# Patient Record
Sex: MALE | Race: WHITE | NOT HISPANIC OR LATINO | Employment: OTHER | URBAN - METROPOLITAN AREA
[De-identification: names, ages, dates, MRNs, and addresses within clinical notes are randomized per-mention and may not be internally consistent; named-entity substitution may affect disease eponyms.]

---

## 2022-10-19 ENCOUNTER — ANCILLARY PROCEDURE (OUTPATIENT)
Dept: GENERAL RADIOLOGY | Facility: CLINIC | Age: 42
End: 2022-10-19
Attending: FAMILY MEDICINE
Payer: COMMERCIAL

## 2022-10-19 ENCOUNTER — OFFICE VISIT (OUTPATIENT)
Dept: ORTHOPEDICS | Facility: CLINIC | Age: 42
End: 2022-10-19
Payer: COMMERCIAL

## 2022-10-19 ENCOUNTER — PRE VISIT (OUTPATIENT)
Dept: ORTHOPEDICS | Facility: CLINIC | Age: 42
End: 2022-10-19

## 2022-10-19 DIAGNOSIS — S86.012A ACHILLES TENDON RUPTURE, LEFT, INITIAL ENCOUNTER: Primary | ICD-10-CM

## 2022-10-19 DIAGNOSIS — M25.572 LEFT ANKLE PAIN: ICD-10-CM

## 2022-10-19 PROCEDURE — 99204 OFFICE O/P NEW MOD 45 MIN: CPT | Performed by: FAMILY MEDICINE

## 2022-10-19 PROCEDURE — 73610 X-RAY EXAM OF ANKLE: CPT | Mod: LT | Performed by: STUDENT IN AN ORGANIZED HEALTH CARE EDUCATION/TRAINING PROGRAM

## 2022-10-19 NOTE — TELEPHONE ENCOUNTER
DIAGNOSIS: Left achilles possible rupture DOI: 10/15/22 - Ok'd per Germaine   APPOINTMENT DATE: 10.25.22   NOTES STATUS DETAILS   OFFICE NOTE from referring provider Internal 10.19.22 Dr Sg Calderón, [facility]    LABS     XRAYS (IMAGES & REPORTS) Internal 10.19.22 L ankle

## 2022-10-19 NOTE — LETTER
10/19/2022    RE: Humble Lombardo  [address] [address]     Dear Colleague,    Thank you for referring your patient, Humble Lombardo, to the [facility] [place]. Please see a copy of my visit note below.    Sports Medicine Clinic Visit    PCP: No Ref-Primary, Physician    Humble Lombardo is a 42 year old male who is seen  as self referral presenting with left lower leg pain    Injury: pt notes pushing his boat on a trailer and feeling a pop in his left achilles tendon, 4 days ago    Location of Pain: left achilles  Duration of Pain: 4 day(s)  Rating of Pain: 2/10  Pain is better with: Ice  Pain is worse with: walking  Additional Features: bruising, weakness  Treatment so far consists of: Ice  Prior History of related problems: no    Patient works as a .  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    There were no vitals taken for this visit.      PMH:  Chin laceration 2011  Right distal biceps rupture repair    Active problem list:  There is no problem list on file for this patient.      FH:  No family history on file.    SH:  Social History     Socioeconomic History     Marital status:      Spouse name: Not on file     Number of children: Not on file     Years of education: Not on file     Highest education level: Not on file   Occupational History     Not on file   Tobacco Use     Smoking status: Not on file     Smokeless tobacco: Not on file   Substance and Sexual Activity     Alcohol use: Not on file     Drug use: Not on file     Sexual activity: Not on file   Other Topics Concern     Not on file   Social History Narrative     Not on file     Social Determinants of Health     Financial Resource Strain: Not on file   Food Insecurity: Not on file   Transportation Needs: Not on file   Physical Activity: Not on file   Stress: Not on file   Social Connections: Not on file   Intimate Partner Violence: Not on file   Housing  Stability: Not on file       MEDS:  See EMR, reviewed  ALL:  See EMR, reviewed    REVIEW OF SYSTEMS:  CONSTITUTIONAL:NEGATIVE for fever, chills, change in weight  INTEGUMENTARY/SKIN: NEGATIVE for worrisome rashes, moles or lesions  EYES: NEGATIVE for vision changes or irritation  ENT/MOUTH: NEGATIVE for ear, mouth and throat problems  RESP:NEGATIVE for significant cough or SOB  BREAST: NEGATIVE for masses, tenderness or discharge  CV: NEGATIVE for chest pain, palpitations or peripheral edema  GI: NEGATIVE for nausea, abdominal pain, heartburn, or change in bowel habits  :NEGATIVE for frequency, dysuria, or hematuria  :NEGATIVE for frequency, dysuria, or hematuria  NEURO: NEGATIVE for weakness, dizziness or paresthesias  ENDOCRINE: NEGATIVE for temperature intolerance, skin/hair changes  HEME/ALLERGY/IMMUNE: NEGATIVE for bleeding problems  PSYCHIATRIC: NEGATIVE for changes in mood or affect      Objective: He is tender along the distal Achilles tendon approximately 5 cm above the base of the heel and there is a defect palpated.  It swollen in this area.  In the prone position on the table squeezing of the calf indicates that there is no contiguous attachment, with no ankle response, suggesting a complete Achilles tendon tear.  Sensation is intact distally.  In the upright position he would dorsiflex and volar flex against resistance with strength.  Eversion strength is intact.  Overlying skin is intact.  Appropriate conversation and affect.    I personally reviewed x-rays of the left ankle that show no fracture, no bony abnormality, no significant degenerative change.    Assessment acute, left Achilles tendon rupture x4 days    Plan: We discussed both nonoperative and operative approaches to acute Achilles tendon rupture.  Patient would like to consult with the surgeon regarding operative options referral to Dr. Diego hilario      DME FITTING    Relevant Diagnosis: left achilles injury  Tall walking boot, large  was fit on patient's Left foot.     Person(s) involved in teaching:   Patient    Brace was applied in standard Manner:  Yes  Brace fit well:  Yes  Patient reports brace to fit comfortably:  Yes    Education:   Patient shown self application and removal of brace: Yes  Patient shown how to adjust brace fit, if necessary: Yes  Patient educated on billing and return policy: Yes  Patient confirmed understanding when and how to contact clinic with concerns: Yes    Francisco David ATC on 10/19/2022 at 3:32 PM    Again, thank you for allowing me to participate in the care of your patient.      Sincerely,    Sg Calderón MD

## 2022-10-19 NOTE — TELEPHONE ENCOUNTER
DIAGNOSIS: Left Achilles/ Calf injury    APPOINTMENT DATE: 10.19.22   NOTES STATUS DETAILS   OFFICE NOTE from referring provider In process    OFFICE NOTE from other specialist In process    DISCHARGE SUMMARY from hospital In process    DISCHARGE REPORT from the ER In process    OPERATIVE REPORT In process    EMG report In process    MEDICATION LIST In process    MRI In process    DEXA (osteoporosis/bone health) In process    CT SCAN In process    XRAYS (IMAGES & REPORTS) In process        Action 10.19.22 11:27 AM    Action Taken No records related to dx.

## 2022-10-19 NOTE — PROGRESS NOTES
Sports Medicine Clinic Visit    PCP: No Ref-Primary, Physician    Humble Lombardo is a 42 year old male who is seen  as self referral presenting with left lower leg pain    Injury: pt notes pushing his boat on a trailer and feeling a pop in his left achilles tendon, 4 days ago    Location of Pain: left achilles  Duration of Pain: 4 day(s)  Rating of Pain: 2/10  Pain is better with: Ice  Pain is worse with: walking  Additional Features: bruising, weakness  Treatment so far consists of: Ice  Prior History of related problems: no    Patient works as a .  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    There were no vitals taken for this visit.      PMH:  Chin laceration 2011  Right distal biceps rupture repair    Active problem list:  There is no problem list on file for this patient.      FH:  No family history on file.    SH:  Social History     Socioeconomic History     Marital status:      Spouse name: Not on file     Number of children: Not on file     Years of education: Not on file     Highest education level: Not on file   Occupational History     Not on file   Tobacco Use     Smoking status: Not on file     Smokeless tobacco: Not on file   Substance and Sexual Activity     Alcohol use: Not on file     Drug use: Not on file     Sexual activity: Not on file   Other Topics Concern     Not on file   Social History Narrative     Not on file     Social Determinants of Health     Financial Resource Strain: Not on file   Food Insecurity: Not on file   Transportation Needs: Not on file   Physical Activity: Not on file   Stress: Not on file   Social Connections: Not on file   Intimate Partner Violence: Not on file   Housing Stability: Not on file       MEDS:  See EMR, reviewed  ALL:  See EMR, reviewed    REVIEW OF SYSTEMS:  CONSTITUTIONAL:NEGATIVE for fever, chills, change in weight  INTEGUMENTARY/SKIN: NEGATIVE for worrisome rashes, moles or lesions  EYES: NEGATIVE for vision changes  or irritation  ENT/MOUTH: NEGATIVE for ear, mouth and throat problems  RESP:NEGATIVE for significant cough or SOB  BREAST: NEGATIVE for masses, tenderness or discharge  CV: NEGATIVE for chest pain, palpitations or peripheral edema  GI: NEGATIVE for nausea, abdominal pain, heartburn, or change in bowel habits  :NEGATIVE for frequency, dysuria, or hematuria  :NEGATIVE for frequency, dysuria, or hematuria  NEURO: NEGATIVE for weakness, dizziness or paresthesias  ENDOCRINE: NEGATIVE for temperature intolerance, skin/hair changes  HEME/ALLERGY/IMMUNE: NEGATIVE for bleeding problems  PSYCHIATRIC: NEGATIVE for changes in mood or affect      Objective: He is tender along the distal Achilles tendon approximately 5 cm above the base of the heel and there is a defect palpated.  It swollen in this area.  In the prone position on the table squeezing of the calf indicates that there is no contiguous attachment, with no ankle response, suggesting a complete Achilles tendon tear.  Sensation is intact distally.  In the upright position he would dorsiflex and volar flex against resistance with strength.  Eversion strength is intact.  Overlying skin is intact.  Appropriate conversation and affect.    I personally reviewed x-rays of the left ankle that show no fracture, no bony abnormality, no significant degenerative change.    Assessment acute, left Achilles tendon rupture x4 days    Plan: We discussed both nonoperative and operative approaches to acute Achilles tendon rupture.  Patient would like to consult with the surgeon regarding operative options referral to Dr. Diego ANNE FITTING    Relevant Diagnosis: left achilles injury  Tall walking boot, large was fit on patient's Left foot.     Person(s) involved in teaching:   Patient    Brace was applied in standard Manner:  Yes  Brace fit well:  Yes  Patient reports brace to fit comfortably:  Yes    Education:   Patient shown self application and removal of brace:  Yes  Patient shown how to adjust brace fit, if necessary: Yes  Patient educated on billing and return policy: Yes  Patient confirmed understanding when and how to contact clinic with concerns: Yes    Francisco David ATC on 10/19/2022 at 3:32 PM

## 2022-10-25 ENCOUNTER — OFFICE VISIT (OUTPATIENT)
Dept: ORTHOPEDICS | Facility: CLINIC | Age: 42
End: 2022-10-25
Payer: COMMERCIAL

## 2022-10-25 ENCOUNTER — PRE VISIT (OUTPATIENT)
Dept: ORTHOPEDICS | Facility: CLINIC | Age: 42
End: 2022-10-25

## 2022-10-25 ENCOUNTER — TELEPHONE (OUTPATIENT)
Dept: ORTHOPEDICS | Facility: CLINIC | Age: 42
End: 2022-10-25

## 2022-10-25 ENCOUNTER — HOSPITAL ENCOUNTER (OUTPATIENT)
Facility: AMBULATORY SURGERY CENTER | Age: 42
End: 2022-10-25
Attending: ORTHOPAEDIC SURGERY
Payer: COMMERCIAL

## 2022-10-25 VITALS — WEIGHT: 250 LBS | HEIGHT: 70 IN | BODY MASS INDEX: 35.79 KG/M2

## 2022-10-25 DIAGNOSIS — M25.572 PAIN IN JOINT, ANKLE AND FOOT, LEFT: Primary | ICD-10-CM

## 2022-10-25 DIAGNOSIS — M25.572 PAIN IN JOINT, ANKLE AND FOOT, LEFT: ICD-10-CM

## 2022-10-25 PROCEDURE — 99204 OFFICE O/P NEW MOD 45 MIN: CPT | Performed by: ORTHOPAEDIC SURGERY

## 2022-10-25 RX ORDER — CEFAZOLIN SODIUM 2 G/50ML
2 SOLUTION INTRAVENOUS SEE ADMIN INSTRUCTIONS
Status: CANCELLED | OUTPATIENT
Start: 2022-10-27

## 2022-10-25 RX ORDER — CEFAZOLIN SODIUM 2 G/50ML
2 SOLUTION INTRAVENOUS
Status: CANCELLED | OUTPATIENT
Start: 2022-10-27

## 2022-10-25 NOTE — LETTER
10/25/2022         RE: Humble Lombardo  [address] [address]        Dear Colleague,    Thank you for referring your patient, Humble Lombardo, to the [facility] [place]. Please see a copy of my visit note below.    CHIEF COMPLAINT:  Left Achilles tendon tear sustained on 10/15/2022.    HISTORY OF PRESENT ILLNESS:  Mr. Lombardo is a 42-year-old male who presents today for evaluation of his left Achilles tendon.  He reports to have been pushing his boat into the trailer on his driveway when he sustained acute onset of pain.  Eventually, he was evaluated and diagnosed with an Achilles tendon tear.    Reports to work as a  and to have a physical job.  Reports to enjoy the outdoors activities like hunting, fishing and camping for recreational activities.    Presents in the company of his wife who is a nurse at [employer].    PAST MEDICAL/SURGICAL HISTORY:  Reviewed today.    DRUG ALLERGIES:  Penicillin.    CURRENT MEDICATIONS:  None.    PHYSICAL EXAMINATION:  On today's visit, he presents as a pleasant male in no apparent distress with a height of 5 feet 10 inches and a weight of 250 pounds.  Denies to have any constitutional symptoms.    On today's visit, he presents with a palpable defect across the left Achilles tendon.  Range of motion was not tested secondary to pain.  CMS is grossly intact.    ASSESSMENT:  Left Achilles tendon tear.    PLAN:  Discussed with the patient and his wife the different treatment options including surgical versus conservative approach.  I discussed pros and cons of each option.  After a lengthy discussion, the patient opted to proceed with surgical repair of his left Achilles tendon.    I discussed with him the most likely postoperative course and complications from such intervention, which include but are not limited to infection, bleeding, nerve damage, residual pain, stiffness, weakness, and re-rupture.    All  questions were answered.  Patient was pleased with the discussion.  The patient will schedule surgery at the best of his convenience.  In the meantime, we encouraged him to proceed with elevation as much as possible.    TT:  30 minutes.  CT:  20 minutes.          Again, thank you for allowing me to participate in the care of your patient.        Sincerely,        Rubin Chavez MD

## 2022-10-25 NOTE — NURSING NOTE
Teaching Flowsheet   Relevant Diagnosis: L achilles repair  Teaching Topic: L achilles repair     RN Note: Pt is calm and cooperative, asking questions appropriately. Pt was instructed on pre-surgical packet requirements including H&P, COVID-19 test, NPO, and pre-surgical scrub. Pt verbalized understanding. Pt will schedule H&P c PCP, COVID test 1-2 days before surgery, scrub and packet given to pt. Pt will have surgery this Friday, 10/28/22 at [facility].     Person(s) involved in teaching:   Patient and Significant Other     Motivation Level:  Asks Questions: Yes  Eager to Learn: Yes  Cooperative: Yes  Receptive (willing/able to accept information): Yes  Any cultural factors/Confucianist beliefs that may influence understanding or compliance? No     Patient demonstrates understanding of the following:  Reason for the appointment, diagnosis and treatment plan: Yes  Knowledge of proper use of medications and conditions for which they are ordered (with special attention to potential side effects or drug interactions): Yes  Which situations necessitate calling provider and whom to contact: Yes    Proper use and care of (medical equip, care aids, etc.): Yes  Nutritional needs and diet plan: Yes  Pain management techniques: Yes  Wound Care: Yes  How and/when to access community resources: Yes    Ang Bills, RNCC

## 2022-10-25 NOTE — PROGRESS NOTES
CHIEF COMPLAINT:  Left Achilles tendon tear sustained on 10/15/2022.    HISTORY OF PRESENT ILLNESS:  Mr. Lombardo is a 42-year-old male who presents today for evaluation of his left Achilles tendon.  He reports to have been pushing his boat into the trailer on his driveway when he sustained acute onset of pain.  Eventually, he was evaluated and diagnosed with an Achilles tendon tear.    Reports to work as a  and to have a physical job.  Reports to enjoy the outdoors activities like hunting, fishing and camping for recreational activities.    Presents in the company of his wife who is a nurse at [employer].    PAST MEDICAL/SURGICAL HISTORY:  Reviewed today.    DRUG ALLERGIES:  Penicillin.    CURRENT MEDICATIONS:  None.    PHYSICAL EXAMINATION:  On today's visit, he presents as a pleasant male in no apparent distress with a height of 5 feet 10 inches and a weight of 250 pounds.  Denies to have any constitutional symptoms.    On today's visit, he presents with a palpable defect across the left Achilles tendon.  Range of motion was not tested secondary to pain.  CMS is grossly intact.    ASSESSMENT:  Left Achilles tendon tear.    PLAN:  Discussed with the patient and his wife the different treatment options including surgical versus conservative approach.  I discussed pros and cons of each option.  After a lengthy discussion, the patient opted to proceed with surgical repair of his left Achilles tendon.    I discussed with him the most likely postoperative course and complications from such intervention, which include but are not limited to infection, bleeding, nerve damage, residual pain, stiffness, weakness, and re-rupture.    All questions were answered.  Patient was pleased with the discussion.  The patient will schedule surgery at the best of his convenience.  In the meantime, we encouraged him to proceed with elevation as much as possible.    TT:  30 minutes.  CT:  20 minutes.

## 2022-10-25 NOTE — LETTER
Date:October 25, 2022      Provider requested that no letter be sent. Do not send.       [facility]

## 2022-10-25 NOTE — NURSING NOTE
"Reason For Visit:   Chief Complaint   Patient presents with     Consult     Pushing his boat on a trailer and felt a pop in left ankle DOI 10/15/22. Dr. Calderón refer. No previous injury.       Ht 1.778 m (5' 10\")   Wt 113.4 kg (250 lb)   BMI 35.87 kg/m           Germaine Enamorado ATC    "

## 2022-10-26 ENCOUNTER — TELEPHONE (OUTPATIENT)
Dept: ORTHOPEDICS | Facility: CLINIC | Age: 42
End: 2022-10-26

## 2022-10-26 ENCOUNTER — HOSPITAL ENCOUNTER (OUTPATIENT)
Facility: CLINIC | Age: 42
End: 2022-10-26
Attending: ORTHOPAEDIC SURGERY | Admitting: ORTHOPAEDIC SURGERY
Payer: COMMERCIAL

## 2022-10-26 RX ORDER — ONDANSETRON 2 MG/ML
4 INJECTION INTRAMUSCULAR; INTRAVENOUS EVERY 30 MIN PRN
Status: CANCELLED | OUTPATIENT
Start: 2022-10-26

## 2022-10-26 RX ORDER — OXYCODONE HYDROCHLORIDE 5 MG/1
5 TABLET ORAL EVERY 4 HOURS PRN
Status: CANCELLED | OUTPATIENT
Start: 2022-10-26

## 2022-10-26 RX ORDER — MEPERIDINE HYDROCHLORIDE 25 MG/ML
12.5 INJECTION INTRAMUSCULAR; INTRAVENOUS; SUBCUTANEOUS
Status: CANCELLED | OUTPATIENT
Start: 2022-10-26

## 2022-10-26 RX ORDER — GABAPENTIN 300 MG/1
300 CAPSULE ORAL
Status: CANCELLED | OUTPATIENT
Start: 2022-10-26

## 2022-10-26 RX ORDER — HYDROMORPHONE HYDROCHLORIDE 1 MG/ML
0.2 INJECTION, SOLUTION INTRAMUSCULAR; INTRAVENOUS; SUBCUTANEOUS EVERY 5 MIN PRN
Status: CANCELLED | OUTPATIENT
Start: 2022-10-26

## 2022-10-26 RX ORDER — SODIUM CHLORIDE, SODIUM LACTATE, POTASSIUM CHLORIDE, CALCIUM CHLORIDE 600; 310; 30; 20 MG/100ML; MG/100ML; MG/100ML; MG/100ML
INJECTION, SOLUTION INTRAVENOUS CONTINUOUS
Status: CANCELLED | OUTPATIENT
Start: 2022-10-26

## 2022-10-26 RX ORDER — ONDANSETRON 4 MG/1
4 TABLET, ORALLY DISINTEGRATING ORAL EVERY 30 MIN PRN
Status: CANCELLED | OUTPATIENT
Start: 2022-10-26

## 2022-10-26 RX ORDER — ACETAMINOPHEN 325 MG/1
975 TABLET ORAL ONCE
Status: CANCELLED | OUTPATIENT
Start: 2022-10-26 | End: 2022-10-26

## 2022-10-26 RX ORDER — LIDOCAINE 40 MG/G
CREAM TOPICAL
Status: CANCELLED | OUTPATIENT
Start: 2022-10-26

## 2022-10-26 RX ORDER — FENTANYL CITRATE 50 UG/ML
25 INJECTION, SOLUTION INTRAMUSCULAR; INTRAVENOUS EVERY 5 MIN PRN
Status: CANCELLED | OUTPATIENT
Start: 2022-10-26

## 2022-10-26 RX ORDER — FENTANYL CITRATE 50 UG/ML
25 INJECTION, SOLUTION INTRAMUSCULAR; INTRAVENOUS
Status: CANCELLED | OUTPATIENT
Start: 2022-10-26

## 2022-10-26 NOTE — TELEPHONE ENCOUNTER
Called pt back and LVM explaining new surgery date is confirmed for Monday 10/31 and to make sure he attends his pre op he has scheduled for tomorrow.    Mary Quiros on 10/26/2022 at 2:18 PM

## 2022-10-26 NOTE — TELEPHONE ENCOUNTER
Called pt to see if he would be able to get in with his PCP or another PCP today due to surgery being scheduled for tomorrow. Pt stated he was not aware surgery had been rescheduled from 10/28 to 10//27 and he would not be able to have surgery tomorrow as this date does not work for him. Pt also stated tomorrow was the soonest date they were able to get him in. Informed pt writer would take him off the schedule for tomorrow and give him a call back once we find a new surgery date for him. Informed pt to keep his pre op scheduled for tomorrow and he understood and agreed.    Mary Quiros on 10/26/2022 at 12:26 PM

## 2022-10-26 NOTE — TELEPHONE ENCOUNTER
----- Message from Myrna Fox RN sent at 10/26/2022 10:12 AM CDT -----  Regarding: plan for h&p  Good morning  Do you know the plan for the h&p for this patient? I see he has a pre op scheduled for 9 am but surgery is scheduled for 7:15 am. I am unable to get ahold of patient but have left a message. Thank you  Haylie

## 2022-10-27 ENCOUNTER — OFFICE VISIT (OUTPATIENT)
Dept: FAMILY MEDICINE | Facility: CLINIC | Age: 42
End: 2022-10-27
Payer: COMMERCIAL

## 2022-10-27 VITALS
TEMPERATURE: 97.7 F | HEART RATE: 74 BPM | HEIGHT: 70 IN | OXYGEN SATURATION: 100 % | DIASTOLIC BLOOD PRESSURE: 80 MMHG | BODY MASS INDEX: 38.08 KG/M2 | WEIGHT: 266 LBS | SYSTOLIC BLOOD PRESSURE: 124 MMHG

## 2022-10-27 DIAGNOSIS — M25.572 PAIN IN JOINT, ANKLE AND FOOT, LEFT: ICD-10-CM

## 2022-10-27 DIAGNOSIS — Z01.818 PREOP GENERAL PHYSICAL EXAM: Primary | ICD-10-CM

## 2022-10-27 LAB
ERYTHROCYTE [DISTWIDTH] IN BLOOD BY AUTOMATED COUNT: 12.4 % (ref 10–15)
HCT VFR BLD AUTO: 43.8 % (ref 40–53)
HGB BLD-MCNC: 15 G/DL (ref 13.3–17.7)
MCH RBC QN AUTO: 33.6 PG (ref 26.5–33)
MCHC RBC AUTO-ENTMCNC: 34.2 G/DL (ref 31.5–36.5)
MCV RBC AUTO: 98 FL (ref 78–100)
PLATELET # BLD AUTO: 154 10E3/UL (ref 150–450)
RBC # BLD AUTO: 4.47 10E6/UL (ref 4.4–5.9)
WBC # BLD AUTO: 8.6 10E3/UL (ref 4–11)

## 2022-10-27 PROCEDURE — 99204 OFFICE O/P NEW MOD 45 MIN: CPT | Performed by: NURSE PRACTITIONER

## 2022-10-27 PROCEDURE — 85027 COMPLETE CBC AUTOMATED: CPT | Performed by: NURSE PRACTITIONER

## 2022-10-27 PROCEDURE — 36415 COLL VENOUS BLD VENIPUNCTURE: CPT | Performed by: NURSE PRACTITIONER

## 2022-10-27 NOTE — PROGRESS NOTES
[address] [address]  Phone: [phone]  Primary Provider: No Ref-Primary, Physician  Pre-op Performing Provider: SELINA RINCON      PREOPERATIVE EVALUATION:  Today's date: 10/27/2022    Humble Lombardo is a 42 year old male who presents for a preoperative evaluation.    Surgical Information:  Surgery/Procedure: Left Achilles Tendon Repair   Surgery Location:   Surgeon: Dr Chavez  Surgery Date: 10/31/2022  Time of Surgery: 5:00PM  Where patient plans to recover: At home with family  Fax number for surgical facility: Note does not need to be faxed, will be available electronically in Epic.    Type of Anesthesia Anticipated: General    Assessment & Plan     The proposed surgical procedure is considered INTERMEDIATE risk.    Preop general physical exam  Cleared for surgery. Hgb stable.     - CBC with platelets    Pain in joint, ankle and foot, left  Reason for surgery.              Risks and Recommendations:  The patient has the following additional risks and recommendations for perioperative complications:   - No identified additional risk factors other than previously addressed    Medication Instructions:  Patient is on no chronic medications    RECOMMENDATION:  APPROVAL GIVEN to proceed with proposed procedure, without further diagnostic evaluation.                      Subjective     HPI related to upcoming procedure: He was pushing his boat on the trailor in his driveway, he tore his achilles tendon on the left foot. Surgery 10/31/22.     Preop Questions 10/27/2022   1. Have you ever had a heart attack or stroke? No   2. Have you ever had surgery on your heart or blood vessels, such as a stent placement, a coronary artery bypass, or surgery on an artery in your head, neck, heart, or legs? No   3. Do you have chest pain with activity? No   4. Do you have a history of  heart failure? No   5. Do you currently have a cold, bronchitis or symptoms of  other infection? No   6. Do you have a cough, shortness of breath, or wheezing? No   7. Do you or anyone in your family have previous history of blood clots? No   8. Do you or does anyone in your family have a serious bleeding problem such as prolonged bleeding following surgeries or cuts? No   9. Have you ever had problems with anemia or been told to take iron pills? No   10. Have you had any abnormal blood loss such as black, tarry or bloody stools? No   11. Have you ever had a blood transfusion? No   12. Are you willing to have a blood transfusion if it is medically needed before, during, or after your surgery? Yes   13. Have you or any of your relatives ever had problems with anesthesia? No   14. Do you have sleep apnea, excessive snoring or daytime drowsiness? No   15. Do you have any artifical heart valves or other implanted medical devices like a pacemaker, defibrillator, or continuous glucose monitor? No   16. Do you have artificial joints? No   17. Are you allergic to latex? No       Health Care Directive:  Patient does not have a Health Care Directive or Living Will: Discussed advance care planning with patient; however, patient declined at this time.    Preoperative Review of :   reviewed - no record of controlled substances prescribed.      Status of Chronic Conditions:  See problem list for active medical problems.  Problems all longstanding and stable, except as noted/documented.  See ROS for pertinent symptoms related to these conditions.      Review of Systems  CONSTITUTIONAL: NEGATIVE for fever, chills, change in weight  INTEGUMENTARY/SKIN: NEGATIVE for worrisome rashes, moles or lesions  EYES: NEGATIVE for vision changes or irritation  ENT/MOUTH: NEGATIVE for ear, mouth and throat problems  RESP: NEGATIVE for significant cough or SOB  CV: NEGATIVE for chest pain, palpitations or peripheral edema  GI: NEGATIVE for nausea, abdominal pain, heartburn, or change in bowel habits  : NEGATIVE for  "frequency, dysuria, or hematuria  MUSCULOSKELETAL: NEGATIVE for significant arthralgias or myalgia  NEURO: NEGATIVE for weakness, dizziness or paresthesias  ENDOCRINE: NEGATIVE for temperature intolerance, skin/hair changes  HEME: NEGATIVE for bleeding problems  PSYCHIATRIC: NEGATIVE for changes in mood or affect    Patient Active Problem List    Diagnosis Date Noted     Pain in joint, ankle and foot, left 10/25/2022     Priority: Medium     Added automatically from request for surgery [id number]        History reviewed. No pertinent past medical history.  History reviewed. No pertinent surgical history.  No current outpatient medications on file.       Allergies   Allergen Reactions     Penicillins         Social History     Tobacco Use     Smoking status: Never     Smokeless tobacco: Current     Types: Chew   Substance Use Topics     Alcohol use: Yes     History reviewed. No pertinent family history.  History   Drug Use Unknown         Objective     /80 (BP Location: Right arm, Patient Position: Chair, Cuff Size: Adult Large)   Pulse 74   Temp 97.7  F (36.5  C) (Tympanic)   Ht 1.778 m (5' 10\")   Wt 120.7 kg (266 lb)   SpO2 100%   BMI 38.17 kg/m      Physical Exam    GENERAL APPEARANCE: healthy, alert and no distress     EYES: EOMI,  PERRL     HENT: ear canals and TM's normal and nose and mouth without ulcers or lesions     NECK: no adenopathy, no asymmetry, masses, or scars and thyroid normal to palpation     RESP: lungs clear to auscultation - no rales, rhonchi or wheezes     CV: regular rates and rhythm, normal S1 S2, no S3 or S4 and no murmur, click or rub     ABDOMEN:  soft, nontender, no HSM or masses and bowel sounds normal     MS: extremities normal- no gross deformities noted, no evidence of inflammation in joints, FROM in all extremities.     SKIN: no suspicious lesions or rashes     NEURO: Normal strength and tone, sensory exam grossly normal, mentation intact and speech normal     PSYCH: " mentation appears normal. and affect normal/bright     LYMPHATICS: No cervical adenopathy    No results for input(s): HGB, PLT, INR, NA, POTASSIUM, CR, A1C in the last 67275 hours.     Diagnostics:  Recent Results (from the past 24 hour(s))   CBC with platelets    Collection Time: 10/27/22  9:15 AM   Result Value Ref Range    WBC Count 8.6 4.0 - 11.0 10e3/uL    RBC Count 4.47 4.40 - 5.90 10e6/uL    Hemoglobin 15.0 13.3 - 17.7 g/dL    Hematocrit 43.8 40.0 - 53.0 %    MCV 98 78 - 100 fL    MCH 33.6 (H) 26.5 - 33.0 pg    MCHC 34.2 31.5 - 36.5 g/dL    RDW 12.4 10.0 - 15.0 %    Platelet Count 154 150 - 450 10e3/uL      No EKG required, no history of coronary heart disease, significant arrhythmia, peripheral arterial disease or other structural heart disease.    Revised Cardiac Risk Index (RCRI):  The patient has the following serious cardiovascular risks for perioperative complications:   - No serious cardiac risks = 0 points     RCRI Interpretation: 0 points: Class I (very low risk - 0.4% complication rate)           Signed Electronically by: MOIRA Sabillon CNP  Copy of this evaluation report is provided to requesting physician.

## 2022-10-27 NOTE — PATIENT INSTRUCTIONS
Preparing for Your Surgery  Getting started  A nurse will call you to review your health history and instructions. They will give you an arrival time based on your scheduled surgery time. Please be ready to share:    Your doctor's clinic name and phone number    Your medical, surgical and anesthesia history    A list of allergies and sensitivities    A list of medicines, including herbal treatments and over-the-counter drugs    Whether the patient has a legal guardian (ask how to send us the papers in advance)  Please tell us if you're pregnant--or if there's any chance you might be pregnant. Some surgeries may injure a fetus (unborn baby), so they require a pregnancy test. Surgeries that are safe for a fetus don't always need a test, and you can choose whether to have one.   If you have a child who's having surgery, please ask for a copy of Preparing for Your Child's Surgery.    Preparing for surgery    Within 10 to 30 days of surgery: Have a pre-op exam (sometimes called an H&P, or History and Physical). This can be done at a clinic or pre-operative center.  ? If you're having a , you may not need this exam. Talk to your care team.    At your pre-op exam, talk to your care team about all medicines you take. If you need to stop any medicines before surgery, ask when to start taking them again.  ? We do this for your safety. Many medicines can make you bleed too much during surgery. Some change how well surgery (anesthesia) drugs work.    Call your insurance company to let them know you're having surgery. (If you don't have insurance, call [phone].)    Call your clinic if there's any change in your health. This includes signs of a cold or flu (sore throat, runny nose, cough, rash, fever). It also includes a scrape or scratch near the surgery site.    If you have questions on the day of surgery, call your hospital or surgery center.  COVID testing  You may need to be tested for COVID-19 before having  surgery. If so, we will give you instructions (or click here).  Eating and drinking guidelines  For your safety: Unless your surgeon tells you otherwise, follow the guidelines below.    Eat and drink as usual until 8 hours before surgery. After that, no food or milk.    Drink clear liquids until 2 hours before surgery. These are liquids you can see through, like water, Gatorade and Propel Water. You may also have black coffee and tea (no cream or milk).    Nothing by mouth within 2 hours of surgery. This includes gum, candy and breath mints.    If you drink alcohol: Stop drinking it the night before surgery.    If your care team tells you to take medicine on the morning of surgery, it's okay to take it with a sip of water.  Preventing infection    Shower or bathe the night before and morning of your surgery. Follow the instructions your clinic gave you. (If no instructions, use regular soap.)    Don't shave or clip hair near your surgery site. We'll remove the hair if needed.    Don't smoke or vape the morning of surgery. You may chew nicotine gum up to 2 hours before surgery. A nicotine patch is okay.  ? Note: Some surgeries require you to completely quit smoking and nicotine. Check with your surgeon.    Your care team will make every effort to keep you safe from infection. We will:  ? Clean our hands often with soap and water (or an alcohol-based hand rub).  ? Clean the skin at your surgery site with a special soap that kills germs.  ? Give you a special gown to keep you warm. (Cold raises the risk of infection.)  ? Wear special hair covers, masks, gowns and gloves during surgery.  ? Give antibiotic medicine, if prescribed. Not all surgeries need antibiotics.  What to bring on the day of surgery    Photo ID and insurance card    Copy of your health care directive, if you have one    Glasses and hearing aides (bring cases)  ? You can't wear contacts during surgery    Inhaler and eye drops, if you use them (tell us  about these when you arrive)    CPAP machine or breathing device, if you use them    A few personal items, if spending the night    If you have . . .  ? A pacemaker, ICD (cardiac defibrillator) or other implant: Bring the ID card.  ? An implanted stimulator: Bring the remote control.  ? A legal guardian: Bring a copy of the certified (court-stamped) guardianship papers.  Please remove any jewelry, including body piercings. Leave jewelry and other valuables at home.  If you're going home the day of surgery    You must have a responsible adult drive you home. They should stay with you overnight as well.    If you don't have someone to stay with you, and you aren't safe to go home alone, we may keep you overnight. Insurance often won't pay for this.  After surgery  If it's hard to control your pain or you need more pain medicine, please call your surgeon's office.  Questions?   If you have any questions for your care team, list them here: _________________________________________________________________________________________________________________________________________________________________________ ____________________________________ ____________________________________ ____________________________________  For informational purposes only. Not to replace the advice of your health care provider. Copyright   2003, 2019 [facility]. All rights reserved. Clinically reviewed by Loida Anne MD. [employer] [id number] - REV 07/22.

## 2022-10-28 ENCOUNTER — TELEPHONE (OUTPATIENT)
Dept: ORTHOPEDICS | Facility: CLINIC | Age: 42
End: 2022-10-28

## 2023-01-22 ENCOUNTER — HEALTH MAINTENANCE LETTER (OUTPATIENT)
Age: 43
End: 2023-01-22

## 2024-02-18 ENCOUNTER — HEALTH MAINTENANCE LETTER (OUTPATIENT)
Age: 44
End: 2024-02-18

## 2025-03-09 ENCOUNTER — HEALTH MAINTENANCE LETTER (OUTPATIENT)
Age: 45
End: 2025-03-09